# Patient Record
Sex: FEMALE | Race: WHITE | NOT HISPANIC OR LATINO | Employment: FULL TIME | URBAN - METROPOLITAN AREA
[De-identification: names, ages, dates, MRNs, and addresses within clinical notes are randomized per-mention and may not be internally consistent; named-entity substitution may affect disease eponyms.]

---

## 2023-03-12 PROBLEM — M54.12 CERVICAL RADICULOPATHY: Status: ACTIVE | Noted: 2023-03-12

## 2023-03-12 PROBLEM — B02.9 SHINGLES: Status: ACTIVE | Noted: 2023-03-12

## 2023-03-12 PROBLEM — R20.2 LEFT HAND PARESTHESIA: Status: ACTIVE | Noted: 2023-03-12

## 2023-03-12 PROBLEM — M79.642 LEFT HAND PAIN: Status: ACTIVE | Noted: 2023-03-12

## 2023-03-12 PROBLEM — M25.569 JOINT PAIN, KNEE: Status: ACTIVE | Noted: 2023-03-12

## 2023-03-12 PROBLEM — M22.41 CHONDROMALACIA OF RIGHT PATELLA: Status: ACTIVE | Noted: 2023-03-12

## 2023-03-12 PROBLEM — G56.22 ULNAR NEUROPATHY AT ELBOW OF LEFT UPPER EXTREMITY: Status: ACTIVE | Noted: 2023-03-12

## 2023-03-12 PROBLEM — K21.9 GERD (GASTROESOPHAGEAL REFLUX DISEASE): Status: ACTIVE | Noted: 2023-03-12

## 2023-03-12 PROBLEM — R59.1 LYMPHADENOPATHY: Status: ACTIVE | Noted: 2023-03-12

## 2023-03-12 PROBLEM — G56.00 ACUTE CARPAL TUNNEL SYNDROME: Status: ACTIVE | Noted: 2023-03-12

## 2023-03-12 PROBLEM — G54.0 BRACHIAL PLEXOPATHY: Status: ACTIVE | Noted: 2023-03-12

## 2023-03-12 PROBLEM — M72.2 PLANTAR FASCIITIS: Status: ACTIVE | Noted: 2023-03-12

## 2023-03-12 PROBLEM — R29.898 LEFT HAND WEAKNESS: Status: ACTIVE | Noted: 2023-03-12

## 2023-03-12 PROBLEM — L30.9 DERMATITIS: Status: ACTIVE | Noted: 2023-03-12

## 2023-03-12 PROBLEM — M62.542 MUSCLE WASTING AND ATROPHY, NOT ELSEWHERE CLASSIFIED, LEFT HAND: Status: ACTIVE | Noted: 2023-03-12

## 2023-03-12 PROBLEM — E78.5 HYPERLIPIDEMIA: Status: ACTIVE | Noted: 2023-03-12

## 2023-03-12 PROBLEM — I10 HYPERTENSION: Status: ACTIVE | Noted: 2023-03-12

## 2023-03-12 PROBLEM — R53.83 FATIGUE: Status: ACTIVE | Noted: 2023-03-12

## 2023-03-12 RX ORDER — VIT C/E/ZN/COPPR/LUTEIN/ZEAXAN 250MG-90MG
25 CAPSULE ORAL
COMMUNITY

## 2023-03-12 RX ORDER — LISINOPRIL 2.5 MG/1
1 TABLET ORAL DAILY
COMMUNITY
Start: 2017-08-13

## 2023-03-12 RX ORDER — PRAVASTATIN SODIUM 20 MG/1
20 TABLET ORAL EVERY EVENING
COMMUNITY
Start: 2017-08-30

## 2023-03-21 ENCOUNTER — OFFICE VISIT (OUTPATIENT)
Dept: PRIMARY CARE | Facility: CLINIC | Age: 63
End: 2023-03-21
Payer: COMMERCIAL

## 2023-03-21 ENCOUNTER — LAB (OUTPATIENT)
Dept: LAB | Facility: LAB | Age: 63
End: 2023-03-21
Payer: COMMERCIAL

## 2023-03-21 ENCOUNTER — TELEPHONE (OUTPATIENT)
Dept: PRIMARY CARE | Facility: CLINIC | Age: 63
End: 2023-03-21

## 2023-03-21 VITALS
DIASTOLIC BLOOD PRESSURE: 81 MMHG | HEART RATE: 76 BPM | SYSTOLIC BLOOD PRESSURE: 140 MMHG | WEIGHT: 213 LBS | RESPIRATION RATE: 12 BRPM | BODY MASS INDEX: 38.65 KG/M2

## 2023-03-21 DIAGNOSIS — I10 PRIMARY HYPERTENSION: ICD-10-CM

## 2023-03-21 DIAGNOSIS — M25.542 ARTHRALGIA OF BOTH HANDS: ICD-10-CM

## 2023-03-21 DIAGNOSIS — E78.2 MIXED HYPERLIPIDEMIA: ICD-10-CM

## 2023-03-21 DIAGNOSIS — M25.541 ARTHRALGIA OF BOTH HANDS: ICD-10-CM

## 2023-03-21 DIAGNOSIS — Z00.00 HEALTH CARE MAINTENANCE: Primary | ICD-10-CM

## 2023-03-21 DIAGNOSIS — Z00.00 HEALTH CARE MAINTENANCE: ICD-10-CM

## 2023-03-21 LAB
ALANINE AMINOTRANSFERASE (SGPT) (U/L) IN SER/PLAS: 15 U/L (ref 7–45)
ALBUMIN (G/DL) IN SER/PLAS: 4.4 G/DL (ref 3.4–5)
ALKALINE PHOSPHATASE (U/L) IN SER/PLAS: 80 U/L (ref 33–136)
ANION GAP IN SER/PLAS: 13 MMOL/L (ref 10–20)
ASPARTATE AMINOTRANSFERASE (SGOT) (U/L) IN SER/PLAS: 13 U/L (ref 9–39)
BILIRUBIN TOTAL (MG/DL) IN SER/PLAS: 0.7 MG/DL (ref 0–1.2)
CALCIUM (MG/DL) IN SER/PLAS: 10.6 MG/DL (ref 8.6–10.6)
CARBON DIOXIDE, TOTAL (MMOL/L) IN SER/PLAS: 30 MMOL/L (ref 21–32)
CHLORIDE (MMOL/L) IN SER/PLAS: 101 MMOL/L (ref 98–107)
CHOLESTEROL (MG/DL) IN SER/PLAS: 247 MG/DL (ref 0–199)
CHOLESTEROL IN HDL (MG/DL) IN SER/PLAS: 57.8 MG/DL
CHOLESTEROL/HDL RATIO: 4.3
CREATININE (MG/DL) IN SER/PLAS: 0.84 MG/DL (ref 0.5–1.05)
ERYTHROCYTE DISTRIBUTION WIDTH (RATIO) BY AUTOMATED COUNT: 13.2 % (ref 11.5–14.5)
ERYTHROCYTE MEAN CORPUSCULAR HEMOGLOBIN CONCENTRATION (G/DL) BY AUTOMATED: 33 G/DL (ref 32–36)
ERYTHROCYTE MEAN CORPUSCULAR VOLUME (FL) BY AUTOMATED COUNT: 90 FL (ref 80–100)
ERYTHROCYTES (10*6/UL) IN BLOOD BY AUTOMATED COUNT: 4.42 X10E12/L (ref 4–5.2)
GFR FEMALE: 79 ML/MIN/1.73M2
GLUCOSE (MG/DL) IN SER/PLAS: 85 MG/DL (ref 74–99)
HEMATOCRIT (%) IN BLOOD BY AUTOMATED COUNT: 40 % (ref 36–46)
HEMOGLOBIN (G/DL) IN BLOOD: 13.2 G/DL (ref 12–16)
LDL: 169 MG/DL (ref 0–99)
LEUKOCYTES (10*3/UL) IN BLOOD BY AUTOMATED COUNT: 7.1 X10E9/L (ref 4.4–11.3)
NRBC (PER 100 WBCS) BY AUTOMATED COUNT: 0 /100 WBC (ref 0–0)
PLATELETS (10*3/UL) IN BLOOD AUTOMATED COUNT: 278 X10E9/L (ref 150–450)
POTASSIUM (MMOL/L) IN SER/PLAS: 3.2 MMOL/L (ref 3.5–5.3)
PROTEIN TOTAL: 6.9 G/DL (ref 6.4–8.2)
SEDIMENTATION RATE, ERYTHROCYTE: 20 MM/H (ref 0–30)
SODIUM (MMOL/L) IN SER/PLAS: 141 MMOL/L (ref 136–145)
TRIGLYCERIDE (MG/DL) IN SER/PLAS: 101 MG/DL (ref 0–149)
UREA NITROGEN (MG/DL) IN SER/PLAS: 25 MG/DL (ref 6–23)
VLDL: 20 MG/DL (ref 0–40)

## 2023-03-21 PROCEDURE — 36415 COLL VENOUS BLD VENIPUNCTURE: CPT

## 2023-03-21 PROCEDURE — 3077F SYST BP >= 140 MM HG: CPT | Performed by: INTERNAL MEDICINE

## 2023-03-21 PROCEDURE — 85027 COMPLETE CBC AUTOMATED: CPT

## 2023-03-21 PROCEDURE — 93000 ELECTROCARDIOGRAM COMPLETE: CPT | Performed by: INTERNAL MEDICINE

## 2023-03-21 PROCEDURE — 85652 RBC SED RATE AUTOMATED: CPT

## 2023-03-21 PROCEDURE — 80053 COMPREHEN METABOLIC PANEL: CPT

## 2023-03-21 PROCEDURE — 80061 LIPID PANEL: CPT

## 2023-03-21 PROCEDURE — 99396 PREV VISIT EST AGE 40-64: CPT | Performed by: INTERNAL MEDICINE

## 2023-03-21 PROCEDURE — 3079F DIAST BP 80-89 MM HG: CPT | Performed by: INTERNAL MEDICINE

## 2023-03-21 PROCEDURE — 84443 ASSAY THYROID STIM HORMONE: CPT

## 2023-03-21 NOTE — PROGRESS NOTES
Subjective   Patient ID: Linda Hastings is a 62 y.o. female who presents for No chief complaint on file..    HPI CPE see updated front sheet no chest pain no shortness of breath has gained weight since her last visit so she thinks blood pressure may also be elevated she has had some aches and pains regarding her wrist from time to time there was 1 swelling on the dorsum of her hand not necessarily related to food she had an uncle with gout bowels normal no dysuria struggles with her knee she had been given meloxicam but has not taken it yet concerned about her hair thinning although similar to her mother's hair    Past medical history noted and unchanged    Medications noted and unchanged for blood pressure and cholesterol    Allergies ketorolac and penicillin    Social history no tobacco    Family history thyroid disease    Prevention had attempted Cologuard had not attempted colonoscopy she will consider she has not been to GYN may obtain mammogram in the future prior blood work reviewed    Review of Systems    Objective   There were no vitals taken for this visit.    Physical Exam vital signs noted alert and oriented x3 NCAT PERRLA EOMI nares without discharge OP benign TM normal bilateral EAC clear bilateral no AC nodes no JVD or bruit no thyromegaly chest clear to auscultation and percussion CV regular rate and rhythm S1-S2 without murmur gallop or rub abdomen soft nontender normal active bowel sounds no rebound or guarding no HSM LS spine normal curvature negative straight leg raise negative logroll negative SI joint tenderness extremities no clubbing cyanosis there is nonpitting edema some varicosities intact distal pulses DTR 2+ and brisk musculoskeletal mild DJD changes of the hands not so much of the wrist bilateral knee DJD some crepitus limited range of motion no laxity    Assessment/Plan impression General medical examination hypertension hyperlipidemia DJD knees hand arthralgia other diagnoses  Plan  check EKG advised on heart check ESR advised on inflammation check Chem-7 advised on glucose potassium and kidney function check CBC advised on blood count check lipid panel advised on cholesterol profile check hepatic panel advised on liver enzymes and metabolism Tylenol as needed for the hands and knees may follow-up with orthopedic surgery has been going to physical therapy check TSH advised on thyroid with regards to mild alopecia set up for colonoscopy when she is able she wishes to defer that at this time continue with other medications recheck 3 months based on above TT 60 cc 31    Check all blood work

## 2023-03-22 LAB — THYROTROPIN (MIU/L) IN SER/PLAS BY DETECTION LIMIT <= 0.05 MIU/L: 2.41 MIU/L (ref 0.44–3.98)

## 2023-06-29 DIAGNOSIS — I10 BENIGN ESSENTIAL HYPERTENSION: Primary | ICD-10-CM

## 2023-06-29 RX ORDER — HYDROCHLOROTHIAZIDE 25 MG/1
25 TABLET ORAL DAILY
COMMUNITY
Start: 2023-06-05 | End: 2023-06-29 | Stop reason: SDUPTHER

## 2023-07-01 RX ORDER — HYDROCHLOROTHIAZIDE 25 MG/1
25 TABLET ORAL DAILY
Qty: 90 TABLET | Refills: 3 | Status: SHIPPED | OUTPATIENT
Start: 2023-07-01

## 2024-04-29 ENCOUNTER — HOSPITAL ENCOUNTER (OUTPATIENT)
Dept: RADIOLOGY | Facility: CLINIC | Age: 64
Discharge: HOME | End: 2024-04-29
Payer: COMMERCIAL

## 2024-04-29 DIAGNOSIS — M25.551 PAIN OF RIGHT HIP: ICD-10-CM

## 2024-04-29 PROCEDURE — 73502 X-RAY EXAM HIP UNI 2-3 VIEWS: CPT | Mod: RT

## 2024-04-29 PROCEDURE — 73502 X-RAY EXAM HIP UNI 2-3 VIEWS: CPT | Mod: RIGHT SIDE | Performed by: RADIOLOGY

## 2024-05-13 ENCOUNTER — TELEPHONE (OUTPATIENT)
Dept: PRIMARY CARE | Facility: CLINIC | Age: 64
End: 2024-05-13
Payer: COMMERCIAL

## 2024-07-08 NOTE — PROGRESS NOTES
Subjective   Linda Hastings is a 63 y.o. female who presents for new patient.    HPI  63-year-old female here to become a new patient, is a known history of dyslipidemia with elevated LDL cholesterol history of hypertension elevated BMI, patient is here to become established and the patient was a major medical pain denies chest pain shortness of breath fever chill nausea vomit constipation diarrhea dysuria urgency frequency.  Review of Systems  10 system review pertinent as above  Objective     Visit Vitals  /82   Pulse 78   Temp 36.6 °C (97.9 °F)   Resp 16      Physical Exam    HEENT: Atraumatic normocephalic the pupils are equal and round and reactive to light the sclerae nonicteric extraocular motion are intact.  Neck: Is supple without JVD no carotid bruits the trachea is midline there are no masses pulses are equal and bilateral with normal upstroke.  Skin: Normal.  Skin good texture.  Moist.  Good turgor.  No lesions, no rashes.  Lymph: No lymphadenopathy appreciated, no masses, no lesions  Lungs: Are clear to auscultation and percussion, good breath sounds bilaterally, no rhonchi, no wheezing, good diaphragmatic excursion.  Heart: Normal rate and normal rhythm S1, S2, no S3, no gallop, murmur or rub.  Abdomen: Soft, nontender, no organomegaly, good bowel sounds.    Extremities: Full range of motion, good pulses bilateral.  No cyanosis, no clubbing or edema.  Neuro: Cranial nerves II-XII are grossly intact there is no sensory or motor deficits.  Able to move all extremities.    Assessment/Plan     Patient is here to become a new patient    Personally reviewed medical records include but limited to blood work and radiology report    Fasting blood works    CBC BMP lipids AST LT vitamin 25-hydroxy TSH    Prevention  Colonoscopy declined, wishes to proceed with Cologurd  Mammogram 07/11/2024  Bone density ordered 2024 July    Immunization  Flu vaccine fall 2024  Pneumonia vaccine age 65  Shingles vaccine   needed    Continue with the low-fat, low-cholesterol diet,  I recommended Mediterranean diet, which include fish, chicken, vegetables and olive oil  Exercise daily for 30 minutes at least 3 times a week  Continue home medications    Hypertension  No added salt diet, do not and salt to your food  Try to exercise every other day for 30 minutes  Continue current medications    Elevated BMI 37.60  Low-fat, low-cholesterol diet, exercise, daily  Ideal BMI is between 23 and 26 kg/m²  Low carbohydrate diet.    Osteoarthritis rt hip    GYN has new   Problem List Items Addressed This Visit       Tiredness    Relevant Orders    TSH    Dyslipidemia    Relevant Orders    CT cardiac scoring wo IV contrast    Lipid Panel    AST    ALT    Colon cancer screening - Primary    Relevant Orders    Cologuard® colon cancer screening    Rectal bleed    Relevant Orders    Cologuard® colon cancer screening    CBC    Post menopausal syndrome    Relevant Orders    XR DEXA bone density    BMI 34.0-34.9,adult     Other Visit Diagnoses       Vitamin D insufficiency        Relevant Orders    Vitamin D 25-Hydroxy,Total (for eval of Vitamin D levels)    Benign essential HTN        Relevant Orders    Basic Metabolic Panel    Benign essential hypertension        Relevant Medications    hydroCHLOROthiazide (HYDRODiuril) 25 mg tablet              Kee Gama MD

## 2024-07-11 ENCOUNTER — APPOINTMENT (OUTPATIENT)
Dept: PRIMARY CARE | Facility: CLINIC | Age: 64
End: 2024-07-11
Payer: COMMERCIAL

## 2024-07-11 VITALS
HEART RATE: 78 BPM | HEIGHT: 61 IN | RESPIRATION RATE: 16 BRPM | TEMPERATURE: 97.9 F | BODY MASS INDEX: 37.57 KG/M2 | WEIGHT: 199 LBS | DIASTOLIC BLOOD PRESSURE: 82 MMHG | SYSTOLIC BLOOD PRESSURE: 130 MMHG

## 2024-07-11 DIAGNOSIS — R53.83 TIREDNESS: ICD-10-CM

## 2024-07-11 DIAGNOSIS — Z12.11 COLON CANCER SCREENING: ICD-10-CM

## 2024-07-11 DIAGNOSIS — I10 BENIGN ESSENTIAL HTN: ICD-10-CM

## 2024-07-11 DIAGNOSIS — I10 BENIGN ESSENTIAL HYPERTENSION: ICD-10-CM

## 2024-07-11 DIAGNOSIS — N95.1 POST MENOPAUSAL SYNDROME: ICD-10-CM

## 2024-07-11 DIAGNOSIS — E78.5 DYSLIPIDEMIA: ICD-10-CM

## 2024-07-11 DIAGNOSIS — K62.5 RECTAL BLEED: ICD-10-CM

## 2024-07-11 DIAGNOSIS — E55.9 VITAMIN D INSUFFICIENCY: ICD-10-CM

## 2024-07-11 DIAGNOSIS — Z12.31 ENCOUNTER FOR SCREENING MAMMOGRAM FOR MALIGNANT NEOPLASM OF BREAST: Primary | ICD-10-CM

## 2024-07-11 PROBLEM — G56.00 ACUTE CARPAL TUNNEL SYNDROME: Status: RESOLVED | Noted: 2023-03-12 | Resolved: 2024-07-11

## 2024-07-11 PROBLEM — E66.9 OBESITY, CLASS II, BMI 35-39.9: Status: ACTIVE | Noted: 2019-10-15

## 2024-07-11 PROBLEM — R29.898 LEFT HAND WEAKNESS: Status: RESOLVED | Noted: 2023-03-12 | Resolved: 2024-07-11

## 2024-07-11 PROBLEM — M54.12 CERVICAL RADICULOPATHY: Status: RESOLVED | Noted: 2023-03-12 | Resolved: 2024-07-11

## 2024-07-11 PROBLEM — K21.9 GERD (GASTROESOPHAGEAL REFLUX DISEASE): Status: RESOLVED | Noted: 2023-03-12 | Resolved: 2024-07-11

## 2024-07-11 PROBLEM — R20.2 LEFT HAND PARESTHESIA: Status: RESOLVED | Noted: 2023-03-12 | Resolved: 2024-07-11

## 2024-07-11 PROBLEM — B02.9 SHINGLES: Status: RESOLVED | Noted: 2023-03-12 | Resolved: 2024-07-11

## 2024-07-11 PROBLEM — E66.812 OBESITY, CLASS II, BMI 35-39.9: Status: ACTIVE | Noted: 2019-10-15

## 2024-07-11 PROBLEM — L30.9 DERMATITIS: Status: RESOLVED | Noted: 2023-03-12 | Resolved: 2024-07-11

## 2024-07-11 PROBLEM — M79.642 LEFT HAND PAIN: Status: RESOLVED | Noted: 2023-03-12 | Resolved: 2024-07-11

## 2024-07-11 PROBLEM — G56.22 ULNAR NEUROPATHY AT ELBOW OF LEFT UPPER EXTREMITY: Status: RESOLVED | Noted: 2023-03-12 | Resolved: 2024-07-11

## 2024-07-11 PROBLEM — K64.4 INFLAMED EXTERNAL HEMORRHOID: Status: ACTIVE | Noted: 2021-03-26

## 2024-07-11 PROBLEM — M62.542 MUSCLE WASTING AND ATROPHY, NOT ELSEWHERE CLASSIFIED, LEFT HAND: Status: RESOLVED | Noted: 2023-03-12 | Resolved: 2024-07-11

## 2024-07-11 PROBLEM — G54.0 BRACHIAL PLEXOPATHY: Status: RESOLVED | Noted: 2023-03-12 | Resolved: 2024-07-11

## 2024-07-11 PROBLEM — R59.1 LYMPHADENOPATHY: Status: RESOLVED | Noted: 2023-03-12 | Resolved: 2024-07-11

## 2024-07-11 PROBLEM — M22.41 CHONDROMALACIA OF RIGHT PATELLA: Status: RESOLVED | Noted: 2023-03-12 | Resolved: 2024-07-11

## 2024-07-11 PROCEDURE — 3079F DIAST BP 80-89 MM HG: CPT | Performed by: INTERNAL MEDICINE

## 2024-07-11 PROCEDURE — 3075F SYST BP GE 130 - 139MM HG: CPT | Performed by: INTERNAL MEDICINE

## 2024-07-11 PROCEDURE — 3008F BODY MASS INDEX DOCD: CPT | Performed by: INTERNAL MEDICINE

## 2024-07-11 PROCEDURE — 1036F TOBACCO NON-USER: CPT | Performed by: INTERNAL MEDICINE

## 2024-07-11 PROCEDURE — 99396 PREV VISIT EST AGE 40-64: CPT | Performed by: INTERNAL MEDICINE

## 2024-07-11 RX ORDER — HYDROCHLOROTHIAZIDE 25 MG/1
25 TABLET ORAL DAILY
Qty: 90 TABLET | Refills: 3 | Status: SHIPPED | OUTPATIENT
Start: 2024-07-11

## 2024-07-11 ASSESSMENT — ENCOUNTER SYMPTOMS
OCCASIONAL FEELINGS OF UNSTEADINESS: 0
LOSS OF SENSATION IN FEET: 0
DEPRESSION: 0

## 2024-07-11 ASSESSMENT — PAIN SCALES - GENERAL: PAINLEVEL: 0-NO PAIN

## 2024-09-26 ENCOUNTER — APPOINTMENT (OUTPATIENT)
Dept: RADIOLOGY | Facility: CLINIC | Age: 64
End: 2024-09-26
Payer: COMMERCIAL

## 2024-10-17 ENCOUNTER — APPOINTMENT (OUTPATIENT)
Dept: PRIMARY CARE | Facility: CLINIC | Age: 64
End: 2024-10-17
Payer: COMMERCIAL

## 2024-10-31 ENCOUNTER — APPOINTMENT (OUTPATIENT)
Dept: RADIOLOGY | Facility: CLINIC | Age: 64
End: 2024-10-31
Payer: COMMERCIAL

## 2024-12-02 ENCOUNTER — TELEPHONE (OUTPATIENT)
Dept: PRIMARY CARE | Facility: CLINIC | Age: 64
End: 2024-12-02
Payer: COMMERCIAL

## 2024-12-02 DIAGNOSIS — Z01.419 ENCOUNTER FOR GYNECOLOGICAL EXAMINATION WITHOUT ABNORMAL FINDING: Primary | ICD-10-CM

## 2024-12-02 DIAGNOSIS — M16.0 OSTEOARTHRITIS OF BOTH HIPS, UNSPECIFIED OSTEOARTHRITIS TYPE: ICD-10-CM

## 2024-12-05 ENCOUNTER — APPOINTMENT (OUTPATIENT)
Dept: RADIOLOGY | Facility: CLINIC | Age: 64
End: 2024-12-05
Payer: COMMERCIAL

## 2024-12-19 ENCOUNTER — APPOINTMENT (OUTPATIENT)
Dept: RADIOLOGY | Facility: HOSPITAL | Age: 64
End: 2024-12-19
Payer: COMMERCIAL

## 2024-12-22 ENCOUNTER — APPOINTMENT (OUTPATIENT)
Dept: RADIOLOGY | Facility: HOSPITAL | Age: 64
End: 2024-12-22

## 2025-01-16 ENCOUNTER — HOSPITAL ENCOUNTER (OUTPATIENT)
Dept: RADIOLOGY | Facility: CLINIC | Age: 65
Discharge: HOME | End: 2025-01-16
Payer: COMMERCIAL

## 2025-01-16 VITALS — HEIGHT: 62 IN | BODY MASS INDEX: 30.91 KG/M2 | WEIGHT: 168 LBS

## 2025-01-16 DIAGNOSIS — N95.1 POST MENOPAUSAL SYNDROME: ICD-10-CM

## 2025-01-16 DIAGNOSIS — Z12.31 ENCOUNTER FOR SCREENING MAMMOGRAM FOR MALIGNANT NEOPLASM OF BREAST: ICD-10-CM

## 2025-01-16 PROCEDURE — 77080 DXA BONE DENSITY AXIAL: CPT | Performed by: RADIOLOGY

## 2025-01-16 PROCEDURE — 77063 BREAST TOMOSYNTHESIS BI: CPT | Performed by: RADIOLOGY

## 2025-01-16 PROCEDURE — 77067 SCR MAMMO BI INCL CAD: CPT | Performed by: RADIOLOGY

## 2025-01-16 PROCEDURE — 77067 SCR MAMMO BI INCL CAD: CPT

## 2025-01-16 PROCEDURE — 77080 DXA BONE DENSITY AXIAL: CPT

## 2025-01-23 ENCOUNTER — APPOINTMENT (OUTPATIENT)
Dept: RADIOLOGY | Facility: CLINIC | Age: 65
End: 2025-01-23
Payer: COMMERCIAL

## 2025-01-31 ENCOUNTER — HOSPITAL ENCOUNTER (OUTPATIENT)
Dept: RADIOLOGY | Facility: CLINIC | Age: 65
Discharge: HOME | End: 2025-01-31
Payer: COMMERCIAL

## 2025-01-31 ENCOUNTER — OFFICE VISIT (OUTPATIENT)
Dept: ORTHOPEDIC SURGERY | Facility: CLINIC | Age: 65
End: 2025-01-31
Payer: COMMERCIAL

## 2025-01-31 ENCOUNTER — APPOINTMENT (OUTPATIENT)
Dept: ORTHOPEDIC SURGERY | Facility: CLINIC | Age: 65
End: 2025-01-31
Payer: COMMERCIAL

## 2025-01-31 VITALS — HEIGHT: 62 IN | WEIGHT: 162 LBS | BODY MASS INDEX: 29.81 KG/M2

## 2025-01-31 DIAGNOSIS — M16.11 PRIMARY OSTEOARTHRITIS OF RIGHT HIP: Primary | ICD-10-CM

## 2025-01-31 DIAGNOSIS — M25.562 PAIN IN BOTH KNEES, UNSPECIFIED CHRONICITY: ICD-10-CM

## 2025-01-31 DIAGNOSIS — M25.552 BILATERAL HIP PAIN: ICD-10-CM

## 2025-01-31 DIAGNOSIS — M25.551 BILATERAL HIP PAIN: ICD-10-CM

## 2025-01-31 DIAGNOSIS — M25.561 PAIN IN BOTH KNEES, UNSPECIFIED CHRONICITY: ICD-10-CM

## 2025-01-31 DIAGNOSIS — M16.0 OSTEOARTHRITIS OF BOTH HIPS, UNSPECIFIED OSTEOARTHRITIS TYPE: ICD-10-CM

## 2025-01-31 PROCEDURE — 3008F BODY MASS INDEX DOCD: CPT | Performed by: STUDENT IN AN ORGANIZED HEALTH CARE EDUCATION/TRAINING PROGRAM

## 2025-01-31 PROCEDURE — 99204 OFFICE O/P NEW MOD 45 MIN: CPT | Performed by: STUDENT IN AN ORGANIZED HEALTH CARE EDUCATION/TRAINING PROGRAM

## 2025-01-31 PROCEDURE — 73521 X-RAY EXAM HIPS BI 2 VIEWS: CPT

## 2025-01-31 NOTE — LETTER
2025     Kee Gama MD  [address] [facility] [address]    Patient: Linda Hastings   YOB: 1960   Date of Visit: 2025       Dear Dr. Kee Gama MD:    Thank you for referring Linda Hastings to me for evaluation. Below are my notes for this consultation.  If you have questions, please do not hesitate to call me. I look forward to following your patient along with you.       Sincerely,     Minerva Dsouza MD      CC: No Recipients  ______________________________________________________________________________________     Minerva Dsouza MD   Adult Reconstruction and Joint Replacement Surgery  Phone: [phone]     Fax: [phone]       Name: Linda Hastings  Age: 64 y.o.   : 1960   Date of Visit: 2025    INITIAL CONSULTATION    CC: Right hip pain    HPI:  This patient presents with several years of RIGHT hip pain.     Patient has tried the following Activity modification, Tylenol (arthritis dosing) , Physical therapy, and Xray. Date of last steroid injection: never. Patient does have pain at night. Patient does not report falls related to this problem. Patient is able to walk 2-3 blocks. Patient is currently using nothing as assistive device. Primarily complains of groin, lateral hip, radiation to knee, and back pain  pain. Patient has difficulty with donning and doffing shoes and socks and stairs. The pain is significantly impacting their ability to perform activities of daily living. Patient reports no longer able to do activities such as walking longer distances.    Avoids nsaids due to hiatal hernia.    Focused History  PMH: Reviewed and PE/DVT: no  PSH: Reviewed , Hip/Knee replacement: no, Hip/Knee surgery: no, Anesthesia complications: no, Spine surgery: no, Surgical infection: no, and Weight loss surgery: no  SHx: Reviewed, Occupation:  at [employer], Current smoker: no, EtOH intake weekly: no, Social support: unk, and  Preferred physical activities: unk  Jehovah´s Witness: no  Meds: Reviewed, Current Anticoagulants: no, Weight loss medication: no, and Current Opioids: no  Allergies: Reviewed  and The patient reports no contraindications or allergies to cephalosporins, aspirin, NSAIDs or opioids, except as noted above.  Dental Hx: Last routine cleaning: Year ago -planning to have recovering procedure and All invasive dental work must be completed 3+ months prior to joint replacement surgery. Dental cleaning must be completed 6+ weeks prior to joint replacement surgery. Patient are to avoid any invasive dental work 3-6 months post-surgically.   FH: No family history of any bleeding or clotting disorders.    PROMS/HISTORY  PROMs   No questionnaires on file.     No past medical history on file.    No past medical history on file.  Documented in chart and reviewed.     Past Surgical History:   Procedure Laterality Date   • ESOPHAGOGASTRODUODENOSCOPY  2015    Diagnostic Esophagogastroduodenoscopy       Allergies: She is allergic to ketorolac and penicillins.     Medications:  Current Outpatient Medications   Medication Instructions   • cholecalciferol (VITAMIN D-3) 25 mcg, oral   • hydroCHLOROthiazide (HYDRODIURIL) 25 mg, oral, Daily       Family History   Problem Relation Name Age of Onset   • Hyperlipidemia Mother     • Osteoporosis Mother     • Thyroid disease Mother     • Breast cancer Mother  70 - 79        stage 4,  age 84   • Glaucoma Father     • Coronary artery disease Father     • Other (Parkinson's disease) Father       Documented in chart and reviewed.     Social History     Tobacco Use   • Smoking status: Never   • Smokeless tobacco: Never   Substance Use Topics   • Alcohol use: Never        Review of Systems: Review of systems completed with medical assistant intake. Please refer to this note.     Physical Exam:  BMI: 30.    General: The patient is well appearing and has an appropriate affect.     Neurological  Examination: SILT in SPN/DPN/Sural/Saphenous/Tibial nerves. 5/5 EHL, FHL, Tibial anterior, Gastrocnemius. Coordination grossly intact.     Cardiovascular Exam: Capillary refill <2 seconds.     Lymphatic Examination: There is no obvious lymphatic swelling present around the involved joint.    Skin Exam: Skin around the pertinent joint is without evidence of infection or rash.    Gait: The patient ambulates with a coxalgic gait.     Lumbar spine:    No tenderness to palpation midline.    Negative straight leg raise bilaterally.    Right Hip Examination:  Gait: Coxalgic gait.    Examination of the hip reveals the skin to be intact.    There is mild tenderness over the greater trochanter.    There is no obvious swelling.    There is a positive Stinchfield test.    Range of motion is: full extension to 95 degrees of flexion.    The hip internally rotates to 15 degrees and externally rotates to 35 degrees.    Abduction is 35 degrees and adduction is 15 degrees.    There is groin and buttock pain with hip motion.    There is a negative straight leg raise.    Abductor strength 4/5.    Left Hip Examination:  Examination of the hip reveals the skin to be intact.    There is no tenderness over the greater trochanter.    There is no obvious swelling.    There is a negative Stinchfield test.    Range of motion is full extension to 100 degrees of flexion.    The hip internally rotates to 20 and externally rotates 40 degrees.    Abduction is 50 degrees and adduction is 20 degrees.    There is no groin and buttock pain with hip motion.    There is a negative straight leg raise.    Abductor strength 4+/5.    Right Knee Examination:  Examination of the right knee reveals the skin to be intact. There is no obvious swelling.    There is mild tenderness to palpation.    Range of motion is 5-115 degrees of flexion.    The knee is stable.    There is moderate grinding with range of motion.    There is mild patellofemoral crepitus.    Left  "Knee Examination:  Examination of the right knee reveals the skin to be intact. There is no obvious swelling.    There is mild tenderness to palpation.    Range of motion is 5-115 degrees of flexion.    The knee is stable.    There is moderate grinding with range of motion.    There is mild patellofemoral crepitus.    Prior Labs:   Prior Labs:   Lab Results   Component Value Date    WBC 7.1 03/21/2023    HGB 13.2 03/21/2023    HCT 40.0 03/21/2023    MCV 90 03/21/2023     03/21/2023      No results found for: \"INR\", \"PROTIME\"      Lab Results   Component Value Date    GLUCOSE 85 03/21/2023    CALCIUM 10.6 03/21/2023     03/21/2023    K 3.2 (L) 03/21/2023    CO2 30 03/21/2023     03/21/2023    BUN 25 (H) 03/21/2023    CREATININE 0.84 03/21/2023      No results found for: \"CKTOTAL\", \"CKMB\", \"CKMBINDEX\", \"TROPONINI\"   No results found for: \"HGBA1C\"      No results found for: \"CRP\"   Lab Results   Component Value Date    SEDRATE 20 03/21/2023         Radiographs:  Radiographs were personally reviewed today with the patient. There is evidence of severe RIGHT  hip osteoarthritis with bone on bone apposition.    Impression:  This patient presents with severe RIGHT  hip osteoarthritis with bone on bone apposition. Patient has tried and failed appropriate conservative measures and now has limitation in ADL's.     Diagnosis:   Primary osteoarthritis of right hip    Bilateral hip pain     Recommendations / Plan:    I have discussed the options in detail with the patient. We have discussed anti-inflammatory medication, activity modification, physical therapy, corticosteroid injections, and total hip replacement surgery. The patient has not yet exhausted all conservative treatment measures.    The risks and benefits of all these treatment options have been discussed in detail.     The patient has tried at least 3 months of the above conservative treatments and continues to have disabling pain, impaired " activities of daily living and worsened quality of life.  Reviewed the surgical optimization steps to optimize their chances for a successful joint replacement surgery.      Currently their BMI is 30.  Discussed that obesity is a risk factor for continued progression of osteoarthritis. Each pound of weight loss offloads their hip and knee joints by 3-6 pounds.  The most effective of these options is weight loss mainly through restricting caloric intake.     A physical therapy prescription was ordered for the patient.  Patient will continue their home exercise program. Strategies for pain management using over-the-counter anti-inflammatory medications reviewed.  Specifically discussed Tylenol up to 1000 mg every 8 hours, with close monitoring for potential side effects from this medication.  Discussed the risks and benefits of steroid injection of the right hip.  The patient has a large cyst in the femoral head as well as hip dysplasia and is at increased risk for development of rapidly progressive arthritis with a cortisone injection.  We made a shared decision to defer steroid injection.  Encouraged them to maintain range of motion and strength around the hip and knee joints.  They will continue to implement these strategies in addressing their pain.      Recommend the patient continue optimizing nonsurgical treatment interventions as outlined above for management of their arthritis.  I would be happy to see them again at any point to discuss surgery if indicated or they are more optimized or to review progress with nonsurgical treatment of arthritis.  The patient verbalizes understanding with the recommendations and treatment plan as outlined above and is in agreement.  Questions were addressed.    _____________  Minerva Dsouza MD   Attending Orthopaedic Surgeon  [facility]    [facility]       This office note was transcribed with dictation software.  Please  excuse any typographical errors, program misunderstandings leading to inadvertent insertions or deletions of inappropriate wording, pronoun errors and other unintentional transcription errors not noticed on proof-reading.

## 2025-01-31 NOTE — PROGRESS NOTES
Minerva Dsouza MD   Adult Reconstruction and Joint Replacement Surgery  Phone: [phone]     Fax: [phone]       Name: Linda Hastings  Age: 64 y.o.   : 1960   Date of Visit: 2025    INITIAL CONSULTATION    CC: Right hip pain    HPI:  This patient presents with several years of RIGHT hip pain.     Patient has tried the following Activity modification, Tylenol (arthritis dosing) , Physical therapy, and Xray. Date of last steroid injection: never. Patient does have pain at night. Patient does not report falls related to this problem. Patient is able to walk 2-3 blocks. Patient is currently using nothing as assistive device. Primarily complains of groin, lateral hip, radiation to knee, and back pain  pain. Patient has difficulty with donning and doffing shoes and socks and stairs. The pain is significantly impacting their ability to perform activities of daily living. Patient reports no longer able to do activities such as walking longer distances.    Avoids nsaids due to hiatal hernia.    Focused History  PMH: Reviewed and PE/DVT: no  PSH: Reviewed , Hip/Knee replacement: no, Hip/Knee surgery: no, Anesthesia complications: no, Spine surgery: no, Surgical infection: no, and Weight loss surgery: no  SHx: Reviewed, Occupation:  at [employer], Current smoker: no, EtOH intake weekly: no, Social support: unk, and Preferred physical activities: unk  Jehovah´s Witness: no  Meds: Reviewed, Current Anticoagulants: no, Weight loss medication: no, and Current Opioids: no  Allergies: Reviewed  and The patient reports no contraindications or allergies to cephalosporins, aspirin, NSAIDs or opioids, except as noted above.  Dental Hx: Last routine cleaning: Year ago -planning to have recovering procedure and All invasive dental work must be completed 3+ months prior to joint replacement surgery. Dental cleaning must be completed 6+ weeks prior to joint replacement surgery. Patient are to avoid  any invasive dental work 3-6 months post-surgically.   FH: No family history of any bleeding or clotting disorders.    PROMS/HISTORY  PROMs   No questionnaires on file.     No past medical history on file.    No past medical history on file.  Documented in chart and reviewed.     Past Surgical History:   Procedure Laterality Date    ESOPHAGOGASTRODUODENOSCOPY  2015    Diagnostic Esophagogastroduodenoscopy       Allergies: She is allergic to ketorolac and penicillins.     Medications:  Current Outpatient Medications   Medication Instructions    cholecalciferol (VITAMIN D-3) 25 mcg, oral    hydroCHLOROthiazide (HYDRODIURIL) 25 mg, oral, Daily       Family History   Problem Relation Name Age of Onset    Hyperlipidemia Mother      Osteoporosis Mother      Thyroid disease Mother      Breast cancer Mother  70 - 79        stage 4,  age 84    Glaucoma Father      Coronary artery disease Father      Other (Parkinson's disease) Father       Documented in chart and reviewed.     Social History     Tobacco Use    Smoking status: Never    Smokeless tobacco: Never   Substance Use Topics    Alcohol use: Never        Review of Systems: Review of systems completed with medical assistant intake. Please refer to this note.     Physical Exam:  BMI: 30.    General: The patient is well appearing and has an appropriate affect.     Neurological Examination: SILT in SPN/DPN/Sural/Saphenous/Tibial nerves. 5/5 EHL, FHL, Tibial anterior, Gastrocnemius. Coordination grossly intact.     Cardiovascular Exam: Capillary refill <2 seconds.     Lymphatic Examination: There is no obvious lymphatic swelling present around the involved joint.    Skin Exam: Skin around the pertinent joint is without evidence of infection or rash.    Gait: The patient ambulates with a coxalgic gait.     Lumbar spine:    No tenderness to palpation midline.    Negative straight leg raise bilaterally.    Right Hip Examination:  Gait: Coxalgic gait.    Examination  "of the hip reveals the skin to be intact.    There is mild tenderness over the greater trochanter.    There is no obvious swelling.    There is a positive Stinchfield test.    Range of motion is: full extension to 95 degrees of flexion.    The hip internally rotates to 15 degrees and externally rotates to 35 degrees.    Abduction is 35 degrees and adduction is 15 degrees.    There is groin and buttock pain with hip motion.    There is a negative straight leg raise.    Abductor strength 4/5.    Left Hip Examination:  Examination of the hip reveals the skin to be intact.    There is no tenderness over the greater trochanter.    There is no obvious swelling.    There is a negative Stinchfield test.    Range of motion is full extension to 100 degrees of flexion.    The hip internally rotates to 20 and externally rotates 40 degrees.    Abduction is 50 degrees and adduction is 20 degrees.    There is no groin and buttock pain with hip motion.    There is a negative straight leg raise.    Abductor strength 4+/5.    Right Knee Examination:  Examination of the right knee reveals the skin to be intact. There is no obvious swelling.    There is mild tenderness to palpation.    Range of motion is 5-115 degrees of flexion.    The knee is stable.    There is moderate grinding with range of motion.    There is mild patellofemoral crepitus.    Left Knee Examination:  Examination of the right knee reveals the skin to be intact. There is no obvious swelling.    There is mild tenderness to palpation.    Range of motion is 5-115 degrees of flexion.    The knee is stable.    There is moderate grinding with range of motion.    There is mild patellofemoral crepitus.    Prior Labs:   Prior Labs:   Lab Results   Component Value Date    WBC 7.1 03/21/2023    HGB 13.2 03/21/2023    HCT 40.0 03/21/2023    MCV 90 03/21/2023     03/21/2023      No results found for: \"INR\", \"PROTIME\"      Lab Results   Component Value Date    GLUCOSE 85 " "03/21/2023    CALCIUM 10.6 03/21/2023     03/21/2023    K 3.2 (L) 03/21/2023    CO2 30 03/21/2023     03/21/2023    BUN 25 (H) 03/21/2023    CREATININE 0.84 03/21/2023      No results found for: \"CKTOTAL\", \"CKMB\", \"CKMBINDEX\", \"TROPONINI\"   No results found for: \"HGBA1C\"      No results found for: \"CRP\"   Lab Results   Component Value Date    SEDRATE 20 03/21/2023         Radiographs:  Radiographs were personally reviewed today with the patient. There is evidence of severe RIGHT  hip osteoarthritis with bone on bone apposition.    Impression:  This patient presents with severe RIGHT  hip osteoarthritis with bone on bone apposition. Patient has tried and failed appropriate conservative measures and now has limitation in ADL's.     Diagnosis:   Primary osteoarthritis of right hip    Bilateral hip pain     Recommendations / Plan:    I have discussed the options in detail with the patient. We have discussed anti-inflammatory medication, activity modification, physical therapy, corticosteroid injections, and total hip replacement surgery. The patient has not yet exhausted all conservative treatment measures.    The risks and benefits of all these treatment options have been discussed in detail.     The patient has tried at least 3 months of the above conservative treatments and continues to have disabling pain, impaired activities of daily living and worsened quality of life.  Reviewed the surgical optimization steps to optimize their chances for a successful joint replacement surgery.      Currently their BMI is 30.  Discussed that obesity is a risk factor for continued progression of osteoarthritis. Each pound of weight loss offloads their hip and knee joints by 3-6 pounds.  The most effective of these options is weight loss mainly through restricting caloric intake.     A physical therapy prescription was ordered for the patient.  Patient will continue their home exercise program. Strategies for pain " management using over-the-counter anti-inflammatory medications reviewed.  Specifically discussed Tylenol up to 1000 mg every 8 hours, with close monitoring for potential side effects from this medication.  Discussed the risks and benefits of steroid injection of the right hip.  The patient has a large cyst in the femoral head as well as hip dysplasia and is at increased risk for development of rapidly progressive arthritis with a cortisone injection.  We made a shared decision to defer steroid injection.  Encouraged them to maintain range of motion and strength around the hip and knee joints.  They will continue to implement these strategies in addressing their pain.      Recommend the patient continue optimizing nonsurgical treatment interventions as outlined above for management of their arthritis.  I would be happy to see them again at any point to discuss surgery if indicated or they are more optimized or to review progress with nonsurgical treatment of arthritis.  The patient verbalizes understanding with the recommendations and treatment plan as outlined above and is in agreement.  Questions were addressed.    _____________  Minerva Dsouza MD   Attending Orthopaedic Surgeon  [facility]    [facility]       This office note was transcribed with dictation software.  Please excuse any typographical errors, program misunderstandings leading to inadvertent insertions or deletions of inappropriate wording, pronoun errors and other unintentional transcription errors not noticed on proof-reading.

## 2025-01-31 NOTE — LETTER
January 31, 2025     Patient: Linda Hastings   YOB: 1960   Date of Visit: 1/31/2025       To Whom It May Concern:    It is my medical opinion that Linda Hastings  should be allowed to use the cafe and is unable to do the stairs at work .    If you have any questions or concerns, please don't hesitate to call.         Sincerely,        Minerva Dsouza MD    CC: No Recipients

## 2025-02-06 ENCOUNTER — APPOINTMENT (OUTPATIENT)
Dept: PRIMARY CARE | Facility: CLINIC | Age: 65
End: 2025-02-06
Payer: COMMERCIAL

## 2025-02-13 ENCOUNTER — HOSPITAL ENCOUNTER (OUTPATIENT)
Dept: RADIOLOGY | Facility: CLINIC | Age: 65
End: 2025-02-13
Payer: COMMERCIAL

## 2025-02-13 ENCOUNTER — HOSPITAL ENCOUNTER (OUTPATIENT)
Dept: RADIOLOGY | Facility: CLINIC | Age: 65
Discharge: HOME | End: 2025-02-13
Payer: COMMERCIAL

## 2025-02-13 DIAGNOSIS — M25.562 PAIN IN BOTH KNEES, UNSPECIFIED CHRONICITY: ICD-10-CM

## 2025-02-13 DIAGNOSIS — M25.561 PAIN IN BOTH KNEES, UNSPECIFIED CHRONICITY: ICD-10-CM

## 2025-02-13 PROCEDURE — 73564 X-RAY EXAM KNEE 4 OR MORE: CPT | Mod: 50

## 2025-02-14 ENCOUNTER — APPOINTMENT (OUTPATIENT)
Dept: ORTHOPEDIC SURGERY | Facility: CLINIC | Age: 65
End: 2025-02-14
Payer: COMMERCIAL

## 2025-02-19 ENCOUNTER — APPOINTMENT (OUTPATIENT)
Dept: ORTHOPEDIC SURGERY | Facility: HOSPITAL | Age: 65
End: 2025-02-19
Payer: COMMERCIAL

## 2025-03-03 ENCOUNTER — APPOINTMENT (OUTPATIENT)
Dept: ORTHOPEDIC SURGERY | Facility: HOSPITAL | Age: 65
End: 2025-03-03
Payer: COMMERCIAL

## 2025-03-09 ENCOUNTER — HOSPITAL ENCOUNTER (OUTPATIENT)
Dept: RADIOLOGY | Facility: HOSPITAL | Age: 65
Discharge: HOME | End: 2025-03-09
Payer: COMMERCIAL

## 2025-03-09 DIAGNOSIS — E78.5 DYSLIPIDEMIA: ICD-10-CM

## 2025-03-09 PROCEDURE — 75571 CT HRT W/O DYE W/CA TEST: CPT

## 2025-03-12 NOTE — PROGRESS NOTES
Subjective   Linda Hastings is a 64 y.o. female who presents for new patient.    HPI  63-year-old female here to become a new patient, is a known history of dyslipidemia with elevated LDL cholesterol history of hypertension elevated BMI, patient is here to become established and the patient was a major medical pain denies chest pain shortness of breath fever chill nausea vomit constipation diarrhea dysuria urgency frequency. Rt hip pain.   Review of Systems  10 system review pertinent as above  Objective     There were no vitals taken for this visit.     Physical Exam    HEENT: Atraumatic normocephalic the pupils are equal and round and reactive to light the sclerae nonicteric extraocular motion are intact.  Neck: Is supple without JVD no carotid bruits the trachea is midline there are no masses pulses are equal and bilateral with normal upstroke.  Skin: Normal.  Skin good texture.  Moist.  Good turgor.  No lesions, no rashes.  Lymph: No lymphadenopathy appreciated, no masses, no lesions  Lungs: Are clear to auscultation and percussion, good breath sounds bilaterally, no rhonchi, no wheezing, good diaphragmatic excursion.  Heart: Normal rate and normal rhythm S1, S2, no S3, no gallop, murmur or rub.  Abdomen: Soft, nontender, no organomegaly, good bowel sounds.    Extremities: Full range of motion, good pulses bilateral.  No cyanosis, no clubbing or edema.  Neuro: Cranial nerves II-XII are grossly intact there is no sensory or motor deficits.  Able to move all extremities.    Assessment/Plan     Rt hip pain bone on bone  Poor sleep due to hip pain  Takes daily tylenol       Fasting blood works    CBC BMP lipids AST LT vitamin 25-hydroxy TSH    Prevention  Colonoscopy declined, wishes to proceed with Cologurd  Mammogram 07/11/2024  Bone density ordered 2024 July    Immunization  Flu vaccine fall 2024  Pneumonia vaccine age 65  Shingles vaccine  needed    Continue with the low-fat, low-cholesterol diet,  I  recommended Mediterranean diet, which include fish, chicken, vegetables and olive oil  Exercise daily for 30 minutes at least 3 times a week  Losing weight  Will check LDL prior to re commanding statin  CACS total 70.45    Hypertension  No added salt diet, do not and salt to your food  Try to exercise every other day for 30 minutes  Continue current medications    Elevated BMI 37.60  Low-fat, low-cholesterol diet, exercise, daily  Ideal BMI is between 23 and 26 kg/m²  Low carbohydrate diet.    Osteoarthritis rt hip    GYN has needed  Problem List Items Addressed This Visit       Dyslipidemia - Primary    Relevant Orders    Lipid Panel    AST    ALT    Hypertension    Essential hypertension, benign    Relevant Orders    Basic Metabolic Panel     Other Visit Diagnoses       Family history of GERD        Relevant Orders    CBC w/5 Part Differential, [facility]    Vitamin D insufficiency        Relevant Orders    Vitamin D 25-Hydroxy,Total (for eval of Vitamin D levels)                Kee Gama MD

## 2025-03-13 ENCOUNTER — APPOINTMENT (OUTPATIENT)
Dept: PRIMARY CARE | Facility: CLINIC | Age: 65
End: 2025-03-13
Payer: COMMERCIAL

## 2025-03-13 VITALS — BODY MASS INDEX: 29.08 KG/M2 | WEIGHT: 158 LBS | HEIGHT: 62 IN

## 2025-03-13 DIAGNOSIS — E55.9 VITAMIN D INSUFFICIENCY: ICD-10-CM

## 2025-03-13 DIAGNOSIS — I10 ESSENTIAL HYPERTENSION, BENIGN: ICD-10-CM

## 2025-03-13 DIAGNOSIS — I10 PRIMARY HYPERTENSION: ICD-10-CM

## 2025-03-13 DIAGNOSIS — Z83.79 FAMILY HISTORY OF GERD: ICD-10-CM

## 2025-03-13 DIAGNOSIS — E78.5 DYSLIPIDEMIA: Primary | ICD-10-CM

## 2025-03-13 LAB
25(OH)D3 SERPL-MCNC: 62 NG/ML (ref 30–100)
ALT SERPL W P-5'-P-CCNC: 20 U/L (ref 16–63)
ANION GAP SERPL CALC-SCNC: 14 MMOL/L (ref 10–20)
AST SERPL W P-5'-P-CCNC: 14 U/L (ref 15–37)
BASOPHILS # BLD AUTO: 0 X10*3/UL (ref 0.1–1.6)
BASOPHILS NFR BLD AUTO: 0.1 % (ref 0–0.3)
BUN SERPL-MCNC: 18 MG/DL (ref 7–18)
CALCIUM SERPL-MCNC: 9.9 MG/DL (ref 8.5–10.1)
CHLORIDE SERPL-SCNC: 102 MMOL/L (ref 98–107)
CHOLEST SERPL-MCNC: 202 MG/DL (ref 0–199)
CHOLESTEROL/HDL RATIO: 4 (ref 4.2–7)
CO2 SERPL-SCNC: 28 MMOL/L (ref 21–32)
CREAT SERPL-MCNC: 0.77 MG/DL (ref 0.6–1.1)
EGFRCR SERPLBLD CKD-EPI 2021: 87 ML/MIN/1.73M*2
EOSINOPHIL # BLD AUTO: 0.06 X10*3/UL (ref 0.04–0.5)
EOSINOPHIL NFR BLD AUTO: 1.27 % (ref 0.7–7)
ERYTHROCYTE [DISTWIDTH] IN BLOOD BY AUTOMATED COUNT: 13.4 % (ref 11.5–14.5)
GLUCOSE SERPL-MCNC: 79 MG/DL (ref 74–100)
HCT VFR BLD AUTO: 36.7 % (ref 36.6–46.6)
HDLC SERPL-MCNC: 50 MG/DL (ref 40–59)
HGB BLD-MCNC: 13.27 G/DL (ref 12–15.4)
IS PATIENT FASTING: YES
LDLC SERPL DIRECT ASSAY-MCNC: 129 MG/DL (ref 0–100)
LYMPHOCYTES # BLD AUTO: 1.45 X10*3/UL (ref 0–6)
LYMPHOCYTES NFR BLD AUTO: 29.77 % (ref 20.5–51.1)
MCH RBC QN AUTO: 32.4 PG (ref 26–32)
MCHC RBC AUTO-ENTMCNC: 36.2 G/DL (ref 31–38)
MCV RBC AUTO: 89.8 FL (ref 80–96)
MONOCYTES # BLD AUTO: 0.4 X10*3/UL (ref 1.6–24.9)
MONOCYTES NFR BLD AUTO: 8.29 % (ref 1.7–9.3)
NEUTROPHILS # BLD AUTO: 2.95 X10*3/UL (ref 1.4–6.5)
NEUTROPHILS NFR BLD AUTO: 60.57 % (ref 42.2–75.2)
PLATELET # BLD AUTO: 232 X10*3/UL (ref 150–450)
PMV BLD AUTO: 8.01 FL (ref 7.8–11)
POTASSIUM SERPL-SCNC: 3.3 MMOL/L (ref 3.5–5.1)
RBC # BLD AUTO: 4.09 X10*6/UL (ref 3.9–5.3)
SODIUM SERPL-SCNC: 141 MMOL/L (ref 136–145)
TRIGL SERPL-MCNC: 75 MG/DL
WBC # BLD AUTO: 4.87 X10*3/UL (ref 4.5–10.5)

## 2025-03-13 PROCEDURE — 99214 OFFICE O/P EST MOD 30 MIN: CPT | Performed by: INTERNAL MEDICINE

## 2025-03-13 PROCEDURE — 3008F BODY MASS INDEX DOCD: CPT | Performed by: INTERNAL MEDICINE

## 2025-03-13 PROCEDURE — 80061 LIPID PANEL: CPT | Performed by: INTERNAL MEDICINE

## 2025-03-13 PROCEDURE — 84460 ALANINE AMINO (ALT) (SGPT): CPT | Performed by: INTERNAL MEDICINE

## 2025-03-13 PROCEDURE — 85025 COMPLETE CBC W/AUTO DIFF WBC: CPT | Performed by: INTERNAL MEDICINE

## 2025-03-13 PROCEDURE — 84450 TRANSFERASE (AST) (SGOT): CPT | Performed by: INTERNAL MEDICINE

## 2025-03-13 PROCEDURE — 82306 VITAMIN D 25 HYDROXY: CPT | Performed by: INTERNAL MEDICINE

## 2025-03-13 PROCEDURE — 80048 BASIC METABOLIC PNL TOTAL CA: CPT | Performed by: INTERNAL MEDICINE

## 2025-03-13 ASSESSMENT — ENCOUNTER SYMPTOMS
OCCASIONAL FEELINGS OF UNSTEADINESS: 0
DEPRESSION: 0
LOSS OF SENSATION IN FEET: 0

## 2025-03-13 ASSESSMENT — PAIN SCALES - GENERAL: PAINLEVEL_OUTOF10: 0-NO PAIN

## 2025-06-03 ENCOUNTER — OFFICE VISIT (OUTPATIENT)
Dept: URGENT CARE | Age: 65
End: 2025-06-03
Payer: COMMERCIAL

## 2025-06-03 VITALS
WEIGHT: 149 LBS | BODY MASS INDEX: 27.42 KG/M2 | TEMPERATURE: 99.1 F | RESPIRATION RATE: 18 BRPM | SYSTOLIC BLOOD PRESSURE: 117 MMHG | HEIGHT: 62 IN | OXYGEN SATURATION: 96 % | HEART RATE: 86 BPM | DIASTOLIC BLOOD PRESSURE: 78 MMHG

## 2025-06-03 DIAGNOSIS — R21 RASH AND NONSPECIFIC SKIN ERUPTION: Primary | ICD-10-CM

## 2025-06-03 PROCEDURE — 3008F BODY MASS INDEX DOCD: CPT | Performed by: STUDENT IN AN ORGANIZED HEALTH CARE EDUCATION/TRAINING PROGRAM

## 2025-06-03 PROCEDURE — 99213 OFFICE O/P EST LOW 20 MIN: CPT | Performed by: STUDENT IN AN ORGANIZED HEALTH CARE EDUCATION/TRAINING PROGRAM

## 2025-06-03 PROCEDURE — 3074F SYST BP LT 130 MM HG: CPT | Performed by: STUDENT IN AN ORGANIZED HEALTH CARE EDUCATION/TRAINING PROGRAM

## 2025-06-03 PROCEDURE — 1036F TOBACCO NON-USER: CPT | Performed by: STUDENT IN AN ORGANIZED HEALTH CARE EDUCATION/TRAINING PROGRAM

## 2025-06-03 PROCEDURE — 3078F DIAST BP <80 MM HG: CPT | Performed by: STUDENT IN AN ORGANIZED HEALTH CARE EDUCATION/TRAINING PROGRAM

## 2025-06-03 RX ORDER — PREDNISONE 10 MG/1
TABLET ORAL
Qty: 35 TABLET | Refills: 0 | Status: SHIPPED | OUTPATIENT
Start: 2025-06-03 | End: 2025-06-18

## 2025-06-03 RX ORDER — MUPIROCIN 20 MG/G
OINTMENT TOPICAL 3 TIMES DAILY
Qty: 22 G | Refills: 0 | Status: SHIPPED | OUTPATIENT
Start: 2025-06-03 | End: 2025-06-10

## 2025-06-03 ASSESSMENT — ENCOUNTER SYMPTOMS
LOSS OF SENSATION IN FEET: 0
DEPRESSION: 0
OCCASIONAL FEELINGS OF UNSTEADINESS: 0

## 2025-06-03 ASSESSMENT — PAIN SCALES - GENERAL: PAINLEVEL_OUTOF10: 5

## 2025-06-03 NOTE — PROGRESS NOTES
"Subjective   Patient ID: Linda Hastings is a 64 y.o. female. They present today with a chief complaint of Rash (Rash on cheeks ).    History of Present Illness  Pt presents with rash to b/l cheeks. Started last night. She was working in the yard that day. She was wearing gloves, does not remember touching the face while working in yard. Has been allergic to poison ivy in the past. She states the rash is on the same exact spot on both cheeks. It is red, oozing yellow fluid that was crusted on face this morning. No hx of similar. No other new exposures to meds, detergents, etc. No contact with a similar rash. She does report using Cetaphil face soap however this is not new for her. She opened a new bottle and thought it smelled more scented than normal. Denies fever, chills. Has appt with dermatology tomorrow.       History provided by:  Patient  Rash        Past Medical History  Allergies as of 06/03/2025 - Reviewed 06/03/2025   Allergen Reaction Noted    Ketorolac Unknown 03/12/2023    Penicillins Unknown 03/12/2023       Prescriptions Prior to Admission[1]     Medical History[2]    Surgical History[3]     reports that she has never smoked. She has never used smokeless tobacco. She reports that she does not drink alcohol and does not use drugs.    Review of Systems  Review of Systems   Skin:  Positive for rash.                                  Objective    Vitals:    06/03/25 1212   BP: 117/78   BP Location: Left arm   Patient Position: Sitting   BP Cuff Size: Small adult   Pulse: 86   Resp: 18   Temp: 37.3 °C (99.1 °F)   TempSrc: Oral   SpO2: 96%   Weight: 67.6 kg (149 lb)   Height: 1.575 m (5' 2\")     No LMP recorded. Patient is postmenopausal.    Physical Exam  Vitals and nursing note reviewed.   Constitutional:       General: She is not in acute distress.     Appearance: Normal appearance. She is not ill-appearing, toxic-appearing or diaphoretic.   HENT:      Head:     Neurological:      Mental Status: She is " alert.         Procedures    Point of Care Test & Imaging Results from this visit  No results found for this visit on 06/03/25.   Imaging  No results found.    Cardiology, Vascular, and Other Imaging  No other imaging results found for the past 2 days      Diagnostic study results (if any) were reviewed by Bernadette Lay PA-C.    Assessment/Plan   Allergies, medications, history, and pertinent labs/EKGs/Imaging reviewed by Bernadette Lay PA-C.     Medical Decision Making  Suspect poison ivy/oak/sumac dermatitis. Possible that patient wiped her face in the same spot on both cheeks with gloves while working outside. Decided on oral prednisone as opposed to topical given need for high potency steroid and rash being on face. I will cover for impetiginization with mupirocin TID x 7 days. Pt has appt with dermatology tomorrow, advised to follow up then. Pt was provided non adherent dressings, advised to keep clean and covered. Return here to urgent care as needed.     Orders and Diagnoses  Diagnoses and all orders for this visit:  Rash and nonspecific skin eruption  -     mupirocin (Bactroban) 2 % ointment; Apply topically 3 times a day for 7 days.  -     predniSONE (Deltasone) 10 mg tablet; Take 4 tablets (40 mg) by mouth once daily for 5 days, THEN 2 tablets (20 mg) once daily for 5 days, THEN 1 tablet (10 mg) once daily for 5 days.      Medical Admin Record      Patient disposition: Home    Electronically signed by Bernadette Lay PA-C  1:12 PM           [1] (Not in a hospital admission)   [2] No past medical history on file.  [3]   Past Surgical History:  Procedure Laterality Date    ESOPHAGOGASTRODUODENOSCOPY  05/12/2015    Diagnostic Esophagogastroduodenoscopy

## 2025-07-18 DIAGNOSIS — I10 BENIGN ESSENTIAL HYPERTENSION: ICD-10-CM

## 2025-07-18 RX ORDER — HYDROCHLOROTHIAZIDE 25 MG/1
25 TABLET ORAL
Qty: 60 TABLET | Refills: 5 | Status: SHIPPED | OUTPATIENT
Start: 2025-07-18

## 2025-08-20 ENCOUNTER — APPOINTMENT (OUTPATIENT)
Dept: ORTHOPEDIC SURGERY | Facility: CLINIC | Age: 65
End: 2025-08-20
Payer: COMMERCIAL

## 2025-08-20 DIAGNOSIS — M16.11 PRIMARY OSTEOARTHRITIS OF RIGHT HIP: Primary | ICD-10-CM

## 2025-08-20 PROCEDURE — 99213 OFFICE O/P EST LOW 20 MIN: CPT | Performed by: ORTHOPAEDIC SURGERY

## 2025-08-20 PROCEDURE — 1036F TOBACCO NON-USER: CPT | Performed by: ORTHOPAEDIC SURGERY

## 2025-08-21 ENCOUNTER — TELEPHONE (OUTPATIENT)
Dept: OBSTETRICS AND GYNECOLOGY | Facility: CLINIC | Age: 65
End: 2025-08-21
Payer: COMMERCIAL